# Patient Record
Sex: FEMALE | Race: BLACK OR AFRICAN AMERICAN | NOT HISPANIC OR LATINO | URBAN - METROPOLITAN AREA
[De-identification: names, ages, dates, MRNs, and addresses within clinical notes are randomized per-mention and may not be internally consistent; named-entity substitution may affect disease eponyms.]

---
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
## 2023-01-01 NOTE — BH INPATIENT PSYCHIATRY PROGRESS NOTE - NSBHFUPINTERVALCCFT_PSY_A_CORE
"I have been out more."
"I'm feeling better because I'm distracting myself."
"I feel so much better in the hospital."
"I'm here because I tried to kill myself."
"I'm feeling better today."
"I'm feeling good, normal."
"I fele more upbeat after talking to my girlfriend."
"I'm feeling good, having a normal day."
"I'm feeling sad today."
"I'm feeling good, up and out."
"I'm feeling good, having a normal day."
"I'm tired today."
"I'm feeling sad today."
"I have been out more."
"I'm angry, I wish this was all fixed."
"I'm feeling good about discharge, ready for the next step."
I have a steady place to live